# Patient Record
Sex: MALE | Race: WHITE | NOT HISPANIC OR LATINO | Employment: OTHER | URBAN - METROPOLITAN AREA
[De-identification: names, ages, dates, MRNs, and addresses within clinical notes are randomized per-mention and may not be internally consistent; named-entity substitution may affect disease eponyms.]

---

## 2022-06-02 ENCOUNTER — HOSPITAL ENCOUNTER (INPATIENT)
Facility: CLINIC | Age: 57
LOS: 2 days | Discharge: HOME | End: 2022-06-05
Attending: EMERGENCY MEDICINE | Admitting: INTERNAL MEDICINE
Payer: COMMERCIAL

## 2022-06-02 DIAGNOSIS — L03.116 CELLULITIS OF LEFT LOWER EXTREMITY: ICD-10-CM
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
## 2022-06-02 LAB
ANION GAP SERPL CALCULATED.3IONS-SCNC: 8 MMOL/L (ref 3–14)
BASOPHILS # BLD AUTO: 0 10E3/UL (ref 0–0.2)
BASOPHILS NFR BLD AUTO: 0 %
BUN SERPL-MCNC: 39 MG/DL (ref 7–30)
CALCIUM SERPL-MCNC: 9.5 MG/DL (ref 8.5–10.1)
CHLORIDE BLD-SCNC: 98 MMOL/L (ref 94–109)
CO2 SERPL-SCNC: 30 MMOL/L (ref 20–32)
CREAT SERPL-MCNC: 1.99 MG/DL (ref 0.66–1.25)
EOSINOPHIL # BLD AUTO: 0.1 10E3/UL (ref 0–0.7)
EOSINOPHIL NFR BLD AUTO: 1 %
ERYTHROCYTE [DISTWIDTH] IN BLOOD BY AUTOMATED COUNT: 13.1 % (ref 10–15)
GFR SERPL CREATININE-BSD FRML MDRD: 38 ML/MIN/1.73M2
GLUCOSE BLD-MCNC: 102 MG/DL (ref 70–99)
HCT VFR BLD AUTO: 40 % (ref 40–53)
HGB BLD-MCNC: 12.3 G/DL (ref 13.3–17.7)
HOLD SPECIMEN: NORMAL
IMM GRANULOCYTES # BLD: 0 10E3/UL
IMM GRANULOCYTES NFR BLD: 0 %
LYMPHOCYTES # BLD AUTO: 1.1 10E3/UL (ref 0.8–5.3)
LYMPHOCYTES NFR BLD AUTO: 12 %
MCH RBC QN AUTO: 28.2 PG (ref 26.5–33)
MCHC RBC AUTO-ENTMCNC: 30.8 G/DL (ref 31.5–36.5)
MCV RBC AUTO: 92 FL (ref 78–100)
MONOCYTES # BLD AUTO: 1 10E3/UL (ref 0–1.3)
MONOCYTES NFR BLD AUTO: 11 %
NEUTROPHILS # BLD AUTO: 6.9 10E3/UL (ref 1.6–8.3)
NEUTROPHILS NFR BLD AUTO: 76 %
NRBC # BLD AUTO: 0 10E3/UL
NRBC BLD AUTO-RTO: 0 /100
PLATELET # BLD AUTO: 228 10E3/UL (ref 150–450)
POTASSIUM BLD-SCNC: 4.4 MMOL/L (ref 3.4–5.3)
RBC # BLD AUTO: 4.36 10E6/UL (ref 4.4–5.9)
SODIUM SERPL-SCNC: 136 MMOL/L (ref 133–144)
WBC # BLD AUTO: 9.1 10E3/UL (ref 4–11)

## 2022-06-02 PROCEDURE — 86140 C-REACTIVE PROTEIN: CPT | Performed by: INTERNAL MEDICINE

## 2022-06-02 PROCEDURE — 87040 BLOOD CULTURE FOR BACTERIA: CPT | Performed by: EMERGENCY MEDICINE

## 2022-06-02 PROCEDURE — 93005 ELECTROCARDIOGRAM TRACING: CPT

## 2022-06-02 PROCEDURE — 80048 BASIC METABOLIC PNL TOTAL CA: CPT | Performed by: EMERGENCY MEDICINE

## 2022-06-02 PROCEDURE — C9803 HOPD COVID-19 SPEC COLLECT: HCPCS

## 2022-06-02 PROCEDURE — 83036 HEMOGLOBIN GLYCOSYLATED A1C: CPT | Performed by: EMERGENCY MEDICINE

## 2022-06-02 PROCEDURE — 36415 COLL VENOUS BLD VENIPUNCTURE: CPT | Performed by: EMERGENCY MEDICINE

## 2022-06-02 PROCEDURE — 99285 EMERGENCY DEPT VISIT HI MDM: CPT | Mod: 25

## 2022-06-02 PROCEDURE — 85025 COMPLETE CBC W/AUTO DIFF WBC: CPT | Performed by: EMERGENCY MEDICINE

## 2022-06-02 RX ORDER — AZITHROMYCIN 500 MG/5ML
500 INJECTION, POWDER, LYOPHILIZED, FOR SOLUTION INTRAVENOUS ONCE
Status: COMPLETED | OUTPATIENT
Start: 2022-06-02 | End: 2022-06-03

## 2022-06-02 RX ORDER — CEFUROXIME SODIUM 1.5 G/16ML
1.5 INJECTION, POWDER, FOR SOLUTION INTRAVENOUS ONCE
Status: DISCONTINUED | OUTPATIENT
Start: 2022-06-02 | End: 2022-06-03
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
## 2022-06-03 PROBLEM — L03.116 CELLULITIS OF LEFT LOWER EXTREMITY: Status: ACTIVE | Noted: 2022-06-03

## 2022-06-03 LAB
ATRIAL RATE - MUSE: 89 BPM
CRP SERPL-MCNC: 200 MG/L (ref 0–8)
DIASTOLIC BLOOD PRESSURE - MUSE: NORMAL MMHG
GLUCOSE BLDC GLUCOMTR-MCNC: 110 MG/DL (ref 70–99)
GLUCOSE BLDC GLUCOMTR-MCNC: 75 MG/DL (ref 70–99)
GLUCOSE BLDC GLUCOMTR-MCNC: 84 MG/DL (ref 70–99)
GLUCOSE BLDC GLUCOMTR-MCNC: 85 MG/DL (ref 70–99)
GLUCOSE BLDC GLUCOMTR-MCNC: 95 MG/DL (ref 70–99)
HBA1C MFR BLD: 6 % (ref 0–5.6)
INTERPRETATION ECG - MUSE: NORMAL
P AXIS - MUSE: 61 DEGREES
PR INTERVAL - MUSE: 166 MS
QRS DURATION - MUSE: 114 MS
QT - MUSE: 352 MS
QTC - MUSE: 428 MS
R AXIS - MUSE: -36 DEGREES
SARS-COV-2 RNA RESP QL NAA+PROBE: NEGATIVE
SYSTOLIC BLOOD PRESSURE - MUSE: NORMAL MMHG
T AXIS - MUSE: 86 DEGREES
VENTRICULAR RATE- MUSE: 89 BPM

## 2022-06-03 PROCEDURE — U0005 INFEC AGEN DETEC AMPLI PROBE: HCPCS | Performed by: EMERGENCY MEDICINE

## 2022-06-03 PROCEDURE — 87149 DNA/RNA DIRECT PROBE: CPT | Performed by: EMERGENCY MEDICINE

## 2022-06-03 PROCEDURE — 258N000003 HC RX IP 258 OP 636: Performed by: INTERNAL MEDICINE

## 2022-06-03 PROCEDURE — 96367 TX/PROPH/DG ADDL SEQ IV INF: CPT

## 2022-06-03 PROCEDURE — 87077 CULTURE AEROBIC IDENTIFY: CPT | Performed by: EMERGENCY MEDICINE

## 2022-06-03 PROCEDURE — 120N000004 HC R&B MS OVERFLOW

## 2022-06-03 PROCEDURE — 99223 1ST HOSP IP/OBS HIGH 75: CPT | Mod: AI | Performed by: INTERNAL MEDICINE

## 2022-06-03 PROCEDURE — 250N000011 HC RX IP 250 OP 636: Performed by: EMERGENCY MEDICINE

## 2022-06-03 PROCEDURE — 250N000011 HC RX IP 250 OP 636: Performed by: INTERNAL MEDICINE

## 2022-06-03 PROCEDURE — 96375 TX/PRO/DX INJ NEW DRUG ADDON: CPT

## 2022-06-03 PROCEDURE — 96365 THER/PROPH/DIAG IV INF INIT: CPT

## 2022-06-03 PROCEDURE — 250N000013 HC RX MED GY IP 250 OP 250 PS 637: Performed by: INTERNAL MEDICINE

## 2022-06-03 PROCEDURE — 96366 THER/PROPH/DIAG IV INF ADDON: CPT

## 2022-06-03 PROCEDURE — 96361 HYDRATE IV INFUSION ADD-ON: CPT

## 2022-06-03 PROCEDURE — 258N000003 HC RX IP 258 OP 636: Performed by: EMERGENCY MEDICINE

## 2022-06-03 PROCEDURE — 36415 COLL VENOUS BLD VENIPUNCTURE: CPT | Performed by: EMERGENCY MEDICINE

## 2022-06-03 RX ORDER — NICOTINE POLACRILEX 4 MG
15-30 LOZENGE BUCCAL
Status: DISCONTINUED | OUTPATIENT
Start: 2022-06-03 | End: 2022-06-05 | Stop reason: HOSPADM

## 2022-06-03 RX ORDER — DEXTROSE MONOHYDRATE 25 G/50ML
25-50 INJECTION, SOLUTION INTRAVENOUS
Status: DISCONTINUED | OUTPATIENT
Start: 2022-06-03 | End: 2022-06-05 | Stop reason: HOSPADM

## 2022-06-03 RX ORDER — ACETAMINOPHEN 325 MG/1
650 TABLET ORAL EVERY 6 HOURS PRN
Status: DISCONTINUED | OUTPATIENT
Start: 2022-06-03 | End: 2022-06-05 | Stop reason: HOSPADM

## 2022-06-03 RX ORDER — LOSARTAN POTASSIUM 100 MG/1
100 TABLET ORAL DAILY
Status: DISCONTINUED | OUTPATIENT
Start: 2022-06-03 | End: 2022-06-05 | Stop reason: HOSPADM

## 2022-06-03 RX ORDER — LOSARTAN POTASSIUM 100 MG/1
100 TABLET ORAL DAILY
COMMUNITY

## 2022-06-03 RX ORDER — SODIUM CHLORIDE 9 MG/ML
INJECTION, SOLUTION INTRAVENOUS CONTINUOUS
Status: DISCONTINUED | OUTPATIENT
Start: 2022-06-03 | End: 2022-06-03

## 2022-06-03 RX ORDER — LIDOCAINE 40 MG/G
CREAM TOPICAL
Status: DISCONTINUED | OUTPATIENT
Start: 2022-06-03 | End: 2022-06-05 | Stop reason: HOSPADM

## 2022-06-03 RX ORDER — HEPARIN SODIUM 5000 [USP'U]/.5ML
5000 INJECTION, SOLUTION INTRAVENOUS; SUBCUTANEOUS EVERY 12 HOURS
Status: DISCONTINUED | OUTPATIENT
Start: 2022-06-03 | End: 2022-06-05 | Stop reason: HOSPADM

## 2022-06-03 RX ORDER — CEFTRIAXONE 1 G/1
1 INJECTION, POWDER, FOR SOLUTION INTRAMUSCULAR; INTRAVENOUS EVERY 24 HOURS
Status: DISCONTINUED | OUTPATIENT
Start: 2022-06-03 | End: 2022-06-04

## 2022-06-03 RX ADMIN — LOSARTAN POTASSIUM 100 MG: 100 TABLET, FILM COATED ORAL at 17:08

## 2022-06-03 RX ADMIN — HEPARIN SODIUM 5000 UNITS: 10000 INJECTION, SOLUTION INTRAVENOUS; SUBCUTANEOUS at 20:04

## 2022-06-03 RX ADMIN — CEFTRIAXONE 1 G: 1 INJECTION, POWDER, FOR SOLUTION INTRAMUSCULAR; INTRAVENOUS at 09:06

## 2022-06-03 RX ADMIN — SODIUM CHLORIDE: 9 INJECTION, SOLUTION INTRAVENOUS at 13:43

## 2022-06-03 RX ADMIN — SODIUM CHLORIDE: 9 INJECTION, SOLUTION INTRAVENOUS at 03:10

## 2022-06-03 RX ADMIN — AZITHROMYCIN MONOHYDRATE 500 MG: 500 INJECTION, POWDER, LYOPHILIZED, FOR SOLUTION INTRAVENOUS at 01:46

## 2022-06-03 RX ADMIN — HEPARIN SODIUM 5000 UNITS: 10000 INJECTION, SOLUTION INTRAVENOUS; SUBCUTANEOUS at 09:07

## 2022-06-03 RX ADMIN — CEFUROXIME 1.5 G: 1.5 INJECTION, POWDER, FOR SOLUTION INTRAVENOUS at 00:57

## 2022-06-03 ASSESSMENT — ACTIVITIES OF DAILY LIVING (ADL)
ADLS_ACUITY_SCORE: 35
ADLS_ACUITY_SCORE: 35
ADLS_ACUITY_SCORE: 20
ADLS_ACUITY_SCORE: 35
ADLS_ACUITY_SCORE: 20
ADLS_ACUITY_SCORE: 35
ADLS_ACUITY_SCORE: 20
ADLS_ACUITY_SCORE: 35

## 2022-06-03 ASSESSMENT — ENCOUNTER SYMPTOMS
DIZZINESS: 0
FEVER: 0
NAUSEA: 1
APPETITE CHANGE: 1

## 2022-06-03 NOTE — PHARMACY-ADMISSION MEDICATION HISTORY
Admission medication history interview status for this patient is complete. See [facility] admission navigator for allergy information, prior to admission medications and immunization status.     Medication history interview done, indicate source(s): Patient  Medication history resources (including written lists, pill bottles, clinic record):None  Pharmacy: [facility] [place]    Changes made to PTA medication list:  Added: metformin, losartan, vitamin B complex, zinc  Changed: None  Reported as Not Taking: None  Removed: allopurinol, aspirin, Santyl ointment, levothyroxine, lisinopril, simvastatin    Actions taken by pharmacist (provider contacted, etc):None     Additional medication history information: Pt states he normally uses 23 units of Semglee at bedtime, but only used 11 units Wednesday night because he was not eating as much.    Medication reconciliation/reorder completed by provider prior to medication history?  N   (Y/N)     For patients on insulin therapy:   Do you use sliding scale insulin based on blood sugars? No  Do you typically eat three meals a day? No, normally 2 per day - appetite has decreased since Monday  How many times do you check your blood glucose per day? Once per day in am  How many episodes of hypoglycemia do you typically have per month? Very rare, last episode ~4-5 weeks ago, symptoms lingered longer than normal. BG gets down to about 95.  Do you have a Continuous Glucose Monitor (CGM)?  No    Prior to Admission medications    Medication Sig Last Dose Taking? Auth Provider   insulin glargine (LANTUS VIAL) 100 UNIT/ML vial Inject 30 Units Subcutaneous At Bedtime  Patient taking differently: Inject 23 Units Subcutaneous At Bedtime 6/1/2022 at 11 units d/t not eating Yes Matthew Rodriguez MD   losartan (COZAAR) 100 MG tablet Take 100 mg by mouth daily 6/2/2022 at am Yes Unknown, Entered By History   metFORMIN (GLUCOPHAGE) 1000 MG tablet Take 1,000 mg by mouth 2 times daily (with  meals) 6/2/2022 at am Yes Unknown, Entered By History   Multiple Vitamins-Minerals (MULTIVITAMIN PO) Take 1 tablet by mouth daily  6/2/2022 at am Yes Reported, Patient   Multiple Vitamins-Minerals (ZINC PO) Take 1 tablet by mouth daily 6/2/2022 at am Yes Unknown, Entered By History   triamcinolone (KENALOG) 0.1 % cream Apply topically 2 times daily as needed  prn at prn Yes Reported, Patient   vitamin B-Complex Take 1 tablet by mouth daily 6/2/2022 at am Yes Unknown, Entered By History

## 2022-06-03 NOTE — ED PROVIDER NOTES
"  History   Chief Complaint:  Cellulitis and Wound Check       The history is provided by the patient.      Donnie Morales is a 57 year old male with history of cellulitis who presents with cellulitis and wound check. He first noticed swelling to his left lower leg 3 days ago. He initially thought he had gout as he has a history of this. Then the next day, he developed redness around the same area. Additionally, he mentions \"pink blotches\" on his left thigh that are painful to the touch. Other symptoms mentioned include nausea and decreased appetite. He mentions that he had a cat and is susceptible to scratches. He was initially seen at [facility] for this, but was sent to the ED for further assessment. 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 He denies fever, chest pain, or dizziness.     Review of Systems   Constitutional: Positive for appetite change. Negative for fever.   Cardiovascular: Positive for leg swelling. Negative for chest pain.   Gastrointestinal: Positive for nausea.   Skin: Positive for rash.   Neurological: Negative for dizziness.   All other systems reviewed and are negative.    Allergies:  No Known Allergies    Medications:  Allopurinol   Asprin   Synthroid   Lisinopril   Simvastatin     Past Medical History:     Orthostatic hypotension   Anemia, unspecified   Vitamin D deficiency   Leukocytoclastic vasculitis   Hyponatremia   Malnutrition of moderate degree   Amput Below Knee left    Hyponatremia   Coagulopathy   Hematuria   Gram positive sepsis   Clostridium difficile colitis   Purpura   Acute renal failure   Hyperkalemia   Gout, unspecified   Diabetes mellitus type II, uncontrolled   Acute blood loss anemia   Urinary retention   Gangrenous cellulitis   Hyponatremia  GOUT  Coagulation factor disorder   Cellulitis     Past Surgical History:    R below the knee amputation   Bilateral cataract extraction     Family History:    Mother - cancer     Social " History:  Patient presents to the ED alone via private vehicle     Physical Exam     Patient Vitals for the past 24 hrs:   BP Temp Temp src Pulse Resp SpO2 Weight   06/03/22 0154 (!) 145/72 98.1  F (36.7  C) Oral 78 16 -- --   06/03/22 0115 (!) 152/77 -- -- 80 -- 96 % --   06/03/22 0045 (!) 155/77 -- -- 81 16 97 % --   06/03/22 0030 (!) 154/83 -- -- 84 -- 98 % --   06/03/22 0015 (!) 151/80 -- -- 81 -- 97 % --   06/03/22 0000 (!) 159/82 -- -- 79 -- 98 % --   06/02/22 2345 (!) 191/100 -- -- 91 -- 99 % --   06/02/22 2023 -- -- -- 78 -- -- --   06/02/22 2021 (!) 200/98 97.8  F (36.6  C) Temporal -- 16 94 % 99.8 kg (220 lb)       Physical Exam  Constitutional:       General: He is not in acute distress.     Appearance: He is not diaphoretic.   HENT:      Head: Atraumatic.   Eyes:      General: No scleral icterus.     Pupils: Pupils are equal, round, and reactive to light.   Cardiovascular:      Rate and Rhythm: Normal rate and regular rhythm.      Heart sounds: Normal heart sounds.   Pulmonary:      Effort: No respiratory distress.      Breath sounds: Normal breath sounds.   Abdominal:      General: Bowel sounds are normal.      Palpations: Abdomen is soft.      Tenderness: There is no abdominal tenderness.   Musculoskeletal:      Cervical back: Neck supple.      Comments: Right below the knee amputation   Skin:     General: Skin is warm.      Capillary Refill: Capillary refill takes less than 2 seconds.      Comments: Perfusion to the foot is normal.  There is diffuse erythema of the left calf area.  There is lymphangitis streaking onto the left thigh up to the level of the groin.  There is no crepitance or sloughing.  There is minimal tenderness in this area.  Patient does have a wound over the posterior calf that he says was from a cat scratch.   Neurological:      Comments: Diminished sensation in the left foot related to neuropathy   Psychiatric:         Mood and Affect: Mood normal.         Behavior: Behavior  normal.       Emergency Department Course   ECG:  ECG taken at 2037, ECG read at 2041  Normal sinus rhythm   Left axis deviation   Left ventricular hypertrophy with repolarization abnormality   Abnormal ECG   Rate 89 bpm. AL interval 166 ms. QRS duration 114 ms. QT/QTc 352/428 ms. P-R-T axes 61 -36 86.     Laboratory:  Labs Ordered and Resulted from Time of ED Arrival to Time of ED Departure   BASIC METABOLIC PANEL - Abnormal       Result Value    Sodium 136      Potassium 4.4      Chloride 98      Carbon Dioxide (CO2) 30      Anion Gap 8      Urea Nitrogen 39 (*)     Creatinine 1.99 (*)     Calcium 9.5      Glucose 102 (*)     GFR Estimate 38 (*)    CBC WITH PLATELETS AND DIFFERENTIAL - Abnormal    WBC Count 9.1      RBC Count 4.36 (*)     Hemoglobin 12.3 (*)     Hematocrit 40.0      MCV 92      MCH 28.2      MCHC 30.8 (*)     RDW 13.1      Platelet Count 228      % Neutrophils 76      % Lymphocytes 12      % Monocytes 11      % Eosinophils 1      % Basophils 0      % Immature Granulocytes 0      NRBCs per 100 WBC 0      Absolute Neutrophils 6.9      Absolute Lymphocytes 1.1      Absolute Monocytes 1.0      Absolute Eosinophils 0.1      Absolute Basophils 0.0      Absolute Immature Granulocytes 0.0      Absolute NRBCs 0.0     COVID-19 VIRUS (CORONAVIRUS) BY PCR - Normal    SARS CoV2 PCR Negative     GLUCOSE BY METER - Normal    GLUCOSE BY METER POCT 95     GLUCOSE MONITOR NURSING POCT   GLUCOSE MONITOR NURSING POCT   GLUCOSE MONITOR NURSING POCT   GLUCOSE MONITOR NURSING POCT   GLUCOSE MONITOR NURSING POCT   CRP INFLAMMATION   BLOOD CULTURE   BLOOD CULTURE     Emergency Department Course:    Reviewed:  I reviewed nursing notes, vitals and past medical history    Assessments:  2345 I obtained history and examined the patient as noted above. I discussed plan for admission to the hospital.    Consults:  0040 I spoke with Dr. Cullen from [facility], who accepts the patient for admission.    Disposition:  The  patient was admitted to the hospital under the care of Dr. Cullen.     Impression & Plan   Medical Decision Making:  This patient is a 57-year-old man with a history of diabetes who presents to the emergency department for evaluation of swelling and erythema with pain of the left calf area.  He appears to have cellulitis and today has developed lymphangitis up onto the thigh to the level of the groin.  He does not appear to be septic.  Blood cultures obtained.  Given that this was a cat scratch he was started on cefuroxime and azithromycin.  He will be admitted to the hospital service.    Diagnosis:    ICD-10-CM    1. Cellulitis of left lower extremity  L03.116    2. Cat scratch  W55.03XA        Scribe Disclosure:  I, Dwight Choudhury, am serving as a scribe at 11:45 PM on 6/2/2022 to document services personally performed by Alfred Doyle MD based on my observations and the provider's statements to me.        Alfred Doyle MD  06/03/22 0208

## 2022-06-03 NOTE — ED NOTES
"[facility]  ED Nurse Handoff Report    Donnie Morales is a 57 year old male   ED Chief complaint: Cellulitis and Wound Check  . ED Diagnosis:   Final diagnoses:   Cellulitis of left lower extremity   Cat scratch     Allergies: No Known Allergies    Code Status: Full Code  Activity level - Baseline/Home:  Independent. Activity Level - Current:   Independent. Lift room needed: No. Bariatric: No   Needed: No   Isolation: No. Infection: Not Applicable.     Vital Signs:   Vitals:    06/03/22 0000 06/03/22 0015 06/03/22 0030 06/03/22 0045   BP: (!) 159/82 (!) 151/80 (!) 154/83 (!) 155/77   Pulse: 79 81 84 81   Resp:    16   Temp:       TempSrc:       SpO2: 98% 97% 98% 97%   Weight:           Cardiac Rhythm:  ,      Pain level:    Patient confused: No. Patient Falls Risk: Yes.   Elimination Status: Has voided   Patient Report - Initial Complaint: Cellulitis, wound check. Focused Assessment:    Peripheral Neurovascular (Adult) - Peripheral Neurovascular WDL: .WDL except; neurovascular assessment lower  Additional Documentation: Edema (Group)   Edema - Edema: dependent; leg, left; ankle, left  Dependent Edema: 1+ (Trace)  Leg, Left Edema: 1+ (Trace)  Ankle, Left Edema: 1+ (Trace)   LLE Neurovascular Assessment - Temperature LLE: warm  Color LLE: red; storm  Sensation LLE: numbness present; tingling present (Pt states, \"Numbness and tingly is baseline.\".)  RLE Neurovascular Assessment - Temperature RLE:  (Right BKA.)  Pain/Comfort - Patient Currently in Pain: no  Preferred Pain Scale (Adult): DVPRS (Group)   DVPRS Pain Rating Score - (DVPRS) Pain Rating Score : No pain  Interfered with your usual ACTIVITY?: Does not interfere 0  Interfered with your SLEEP?: Does not interfere 0  Affected your MOOD?: Does not affect 0  Contributed to your STRESS?: Does not contribute 0   Tests Performed:   No orders to display     . Abnormal Results:   Labs Ordered and Resulted from Time of ED Arrival to Time of ED " Departure   BASIC METABOLIC PANEL - Abnormal       Result Value    Sodium 136      Potassium 4.4      Chloride 98      Carbon Dioxide (CO2) 30      Anion Gap 8      Urea Nitrogen 39 (*)     Creatinine 1.99 (*)     Calcium 9.5      Glucose 102 (*)     GFR Estimate 38 (*)    CBC WITH PLATELETS AND DIFFERENTIAL - Abnormal    WBC Count 9.1      RBC Count 4.36 (*)     Hemoglobin 12.3 (*)     Hematocrit 40.0      MCV 92      MCH 28.2      MCHC 30.8 (*)     RDW 13.1      Platelet Count 228      % Neutrophils 76      % Lymphocytes 12      % Monocytes 11      % Eosinophils 1      % Basophils 0      % Immature Granulocytes 0      NRBCs per 100 WBC 0      Absolute Neutrophils 6.9      Absolute Lymphocytes 1.1      Absolute Monocytes 1.0      Absolute Eosinophils 0.1      Absolute Basophils 0.0      Absolute Immature Granulocytes 0.0      Absolute NRBCs 0.0     COVID-19 VIRUS (CORONAVIRUS) BY PCR   BLOOD CULTURE   BLOOD CULTURE     .   Treatments provided: IV abx.  Family Comments: None at bedside.  OBS brochure/video discussed/provided to patient:  No  ED Medications:   Medications   cefuroxime (ZINACEF) 1.5 g vial to attach to  ml bag for ADULTS or NS 50 ml bag for PEDS (1.5 g Intravenous New Bag 6/3/22 0057)   azithromycin 500 mg (ZITHROMAX) in 0.9% NaCl 250 mL intermittent infusion 500 mg (has no administration in time range)     Drips infusing:  Yes  For the majority of the shift, the patient's behavior Green. Interventions performed were NA.    Sepsis treatment initiated: No     Patient tested for COVID 19 prior to admission: YES    ED Nurse Name/Phone Number: Coy Ferrer RN,   1:15 AM    RECEIVING UNIT ED HANDOFF REVIEW    Above ED Nurse Handoff Report was reviewed: Yes  Reviewed by: Rosemary Velasco RN on Luana 3, 2022 at 12:24 PM

## 2022-06-03 NOTE — PLAN OF CARE
Pt calm/cooperative, A/Ox4, denies pain, SOB or discomfort.Pt ambulates independently room with prosthesis. Abx. Given, cultures sent, able to verbalize needs. Will continue to monitor.  Vitals: BP (!) 145/72   Pulse 78   Temp 98.1  F (36.7  C) (Oral)   Resp 16   Wt 99.8 kg (220 lb)   SpO2 96%   BMI 31.57 kg/m    BMI= Body mass index is 31.57 kg/m .

## 2022-06-03 NOTE — ED NOTES
Admit note completed.  Report given to nurse taking over cares for the pt. Questions asked and answered.

## 2022-06-03 NOTE — ED TRIAGE NOTES
R BKA. Pt states since Monday LLE redness, blotching color, Pulses present, CMS in tact. Pt states chronic scratches from cat. Denies hx. Seen at [facility] and referred for workup. ABC in tact. A/OX4,

## 2022-06-03 NOTE — H&P
Admitted: 2022    CHIEF COMPLAINT:  Redness of the left lower extremity.    HISTORY OF PRESENT ILLNESS:  Obtained from the patient.  This is a 57-year-old gentleman with a history of type 2 diabetes, hypertension, CKD stage III, who lives alone with his cat.  On Monday, he started noticing some redness around his ankles.  Initially thought it was gout as he had some pain in his ankle.  This subsided after he took a Naprosyn and then he noticed the redness progressing more proximally up through his knee and into his groin region.  He denies any pain.  He denies any fevers or chills.  He denies any antecedent trauma; however, he states that he has a cat, which may have scratched him.  Blood glucoses have been running in the one-teens.  He denies any chest pain, shortness of breath, nausea or vomiting, or lightheadedness.  He has had some decreased appetite since Monday.  He went initially to an urgent care; however, he was noted to be hypertensive, and given his diabetes, he was referred here to the ER for further evaluation.  In the ER, he was seen by Dr. Alfred Doyle I discussed care with him and I am asked to admit him for further evaluation.    MEDICATIONS:  List includes:  1.  Metformin.  2.  Lantus.  3.  Losartan.    PAST MEDICAL HISTORY:  Diabetes.  Gout.  Hypertension.  CKD stage III.    PAST SURGICAL HISTORY:  Significant for right below-the-knee amputation and bilateral cataract surgery.    FAMILY HISTORY:  Significant for mother who  of some form of cancer with metastasis.    SOCIAL HISTORY:  The patient does not smoke.  He does not drink alcohol.    ALLERGIES:  No known drug allergies.    REVIEW OF SYSTEMS:  As mentioned in the HPI.  The patient denies any recent travel.  All other systems otherwise extensively reviewed and deemed unremarkable and negative.    PHYSICAL EXAMINATION:    VITAL SIGNS:  His temperature is 97.8, his pulse is 78, blood pressure is 200/98, respiratory rate 16, O2 sat  is 94% on room air.  GENERAL:  He is alert, awake, oriented, coherent, nontoxic, in no acute distress on exam.  HEENT:  His pupils are equal, round, reactive to light.  LUNGS:  Clear to auscultation bilaterally.  HEART:  Regular rate.  S1, S2 normal.  No murmurs or gallops.  ABDOMEN:  Soft, nontender, nondistended with good bowel sounds.    EXTREMITIES:  He has a prosthesis on the right lower extremity.  There is no redness noted above-the-knee on the right lower extremity on the left lower extremity, he has had amputation of his second digit.  The site is clean, dry and intact.  He has erythema, warmth, which extends from his ankle up to the knee and then there is lymphangitic spread up to his groin.  There is no palpable lymphadenopathy in the groin.  He has an underlying neuropathy and is not very tender to palpation.  His pulses are feeble, but palpable.  NEUROLOGICAL:  He moves all his extremities.  Cranial nerves II through XII are grossly within normal limits.    LABORATORY DATA:  Obtained here included a basic metabolic panel which is grossly unremarkable other than a BUN of 39 and a creatinine of 1.99 with an estimated GFR of 38.  His glucose was 102.  CBC with diff is grossly unremarkable other than a hemoglobin of 12.3.  Blood cultures obtained in the ER are pending.  An EKG shows normal sinus rhythm at 89 beats per minute with left axis deviation and LVH by voltage criteria.    IMPRESSION/PLAN:    1.  Cellulitis of the left lower extremity immunocompromised gentleman with underlying diabetes and CKD stage III.  We will admit him as an inpatient.  I suspect this is likely due to strep infection.  We will place him on IV Rocephin.  We will have him elevate his left lower extremity.  Blood cultures have been obtained.      2.  CKD stage III.  Avoid nephrotoxins.    3.  Hypertensive urgency.  At baseline, he takes losartan will need resumption once his home medications are reconciled.    4.  Diabetes.  We  will place him on insulin along with sliding scale.    CODE STATUS:  FULL CODE.    He will be admitted as an inpatient.    Jono Cullen MD        D: 2022   T: 2022   MT: [id number]    Name:     JANY HARRINGTON  MRN:      [id number]        Account:     [id number]   :      1965           Admitted:    2022       Document: [id number]

## 2022-06-03 NOTE — PROGRESS NOTES
Agree with assessment and plan provided by Dr. Cullen.  Continue ceftiaxone for now.  Suspect cellulitis secondary to cat scratch, though pt has significant neuropathy so he is unsure if he was scratched.  Pt with decreased appetite and normal BS.  Will hold lantus for now and continue ISS.  If no improvement in cellulitis tomorrow or worsened, will adjust abx to Unasyn.

## 2022-06-03 NOTE — PLAN OF CARE
Pt arrived at approx 1300 from ER. Bp elevated slightly. Denies pain. Left lower extremity red, slightly swollen. Lle elevated. No obvious wound noted. Pt right left stump, 3 red areas,( 1 on stump bottom, 1 on lower thigh and 1 on left sife of knee).  Ivf infusing. Abx given in er this am. OT 75, pt tolerating mod cho diet.     Plan of Care Reviewed With: patient

## 2022-06-04 ENCOUNTER — HEALTH MAINTENANCE LETTER (OUTPATIENT)
Age: 57
End: 2022-06-04

## 2022-06-04 LAB
ANION GAP SERPL CALCULATED.3IONS-SCNC: 8 MMOL/L (ref 3–14)
BASOPHILS # BLD AUTO: 0 10E3/UL (ref 0–0.2)
BASOPHILS NFR BLD AUTO: 0 %
BUN SERPL-MCNC: 32 MG/DL (ref 7–30)
CALCIUM SERPL-MCNC: 9.3 MG/DL (ref 8.5–10.1)
CHLORIDE BLD-SCNC: 107 MMOL/L (ref 94–109)
CO2 SERPL-SCNC: 27 MMOL/L (ref 20–32)
CREAT SERPL-MCNC: 1.53 MG/DL (ref 0.66–1.25)
ENTEROCOCCUS FAECALIS: NOT DETECTED
ENTEROCOCCUS FAECIUM: NOT DETECTED
EOSINOPHIL # BLD AUTO: 0.1 10E3/UL (ref 0–0.7)
EOSINOPHIL NFR BLD AUTO: 1 %
ERYTHROCYTE [DISTWIDTH] IN BLOOD BY AUTOMATED COUNT: 12.9 % (ref 10–15)
GFR SERPL CREATININE-BSD FRML MDRD: 53 ML/MIN/1.73M2
GLUCOSE BLD-MCNC: 124 MG/DL (ref 70–99)
GLUCOSE BLDC GLUCOMTR-MCNC: 112 MG/DL (ref 70–99)
GLUCOSE BLDC GLUCOMTR-MCNC: 132 MG/DL (ref 70–99)
GLUCOSE BLDC GLUCOMTR-MCNC: 178 MG/DL (ref 70–99)
GLUCOSE BLDC GLUCOMTR-MCNC: 198 MG/DL (ref 70–99)
GLUCOSE BLDC GLUCOMTR-MCNC: 95 MG/DL (ref 70–99)
HCT VFR BLD AUTO: 36.3 % (ref 40–53)
HGB BLD-MCNC: 11.2 G/DL (ref 13.3–17.7)
IMM GRANULOCYTES # BLD: 0 10E3/UL
IMM GRANULOCYTES NFR BLD: 0 %
LISTERIA SPECIES (DETECTED/NOT DETECTED): NOT DETECTED
LYMPHOCYTES # BLD AUTO: 1.1 10E3/UL (ref 0.8–5.3)
LYMPHOCYTES NFR BLD AUTO: 15 %
MCH RBC QN AUTO: 28.4 PG (ref 26.5–33)
MCHC RBC AUTO-ENTMCNC: 30.9 G/DL (ref 31.5–36.5)
MCV RBC AUTO: 92 FL (ref 78–100)
MONOCYTES # BLD AUTO: 0.7 10E3/UL (ref 0–1.3)
MONOCYTES NFR BLD AUTO: 10 %
NEUTROPHILS # BLD AUTO: 5.2 10E3/UL (ref 1.6–8.3)
NEUTROPHILS NFR BLD AUTO: 74 %
NRBC # BLD AUTO: 0 10E3/UL
NRBC BLD AUTO-RTO: 0 /100
PLATELET # BLD AUTO: 226 10E3/UL (ref 150–450)
POTASSIUM BLD-SCNC: 3.9 MMOL/L (ref 3.4–5.3)
RBC # BLD AUTO: 3.94 10E6/UL (ref 4.4–5.9)
SODIUM SERPL-SCNC: 142 MMOL/L (ref 133–144)
STAPHYLOCOCCUS AUREUS: NOT DETECTED
STAPHYLOCOCCUS EPIDERMIDIS: NOT DETECTED
STAPHYLOCOCCUS LUGDUNENSIS: NOT DETECTED
STAPHYLOCOCCUS SPECIES: DETECTED
STREPTOCOCCUS AGALACTIAE: NOT DETECTED
STREPTOCOCCUS ANGINOSUS GROUP: NOT DETECTED
STREPTOCOCCUS PNEUMONIAE: NOT DETECTED
STREPTOCOCCUS PYOGENES: NOT DETECTED
STREPTOCOCCUS SPECIES: NOT DETECTED
WBC # BLD AUTO: 7.1 10E3/UL (ref 4–11)

## 2022-06-04 PROCEDURE — 99232 SBSQ HOSP IP/OBS MODERATE 35: CPT | Performed by: INTERNAL MEDICINE

## 2022-06-04 PROCEDURE — 87040 BLOOD CULTURE FOR BACTERIA: CPT | Performed by: HOSPITALIST

## 2022-06-04 PROCEDURE — 250N000013 HC RX MED GY IP 250 OP 250 PS 637: Performed by: INTERNAL MEDICINE

## 2022-06-04 PROCEDURE — 120N000004 HC R&B MS OVERFLOW

## 2022-06-04 PROCEDURE — 80048 BASIC METABOLIC PNL TOTAL CA: CPT | Performed by: INTERNAL MEDICINE

## 2022-06-04 PROCEDURE — 250N000011 HC RX IP 250 OP 636: Performed by: INTERNAL MEDICINE

## 2022-06-04 PROCEDURE — 85025 COMPLETE CBC W/AUTO DIFF WBC: CPT | Performed by: INTERNAL MEDICINE

## 2022-06-04 PROCEDURE — 250N000011 HC RX IP 250 OP 636: Performed by: HOSPITALIST

## 2022-06-04 PROCEDURE — 36415 COLL VENOUS BLD VENIPUNCTURE: CPT | Performed by: INTERNAL MEDICINE

## 2022-06-04 PROCEDURE — 250N000012 HC RX MED GY IP 250 OP 636 PS 637: Performed by: INTERNAL MEDICINE

## 2022-06-04 RX ORDER — CEFTRIAXONE 2 G/1
2 INJECTION, POWDER, FOR SOLUTION INTRAMUSCULAR; INTRAVENOUS EVERY 24 HOURS
Status: DISCONTINUED | OUTPATIENT
Start: 2022-06-04 | End: 2022-06-05 | Stop reason: HOSPADM

## 2022-06-04 RX ADMIN — INSULIN ASPART 1 UNITS: 100 INJECTION, SOLUTION INTRAVENOUS; SUBCUTANEOUS at 18:14

## 2022-06-04 RX ADMIN — HEPARIN SODIUM 5000 UNITS: 10000 INJECTION, SOLUTION INTRAVENOUS; SUBCUTANEOUS at 19:57

## 2022-06-04 RX ADMIN — HEPARIN SODIUM 5000 UNITS: 10000 INJECTION, SOLUTION INTRAVENOUS; SUBCUTANEOUS at 08:14

## 2022-06-04 RX ADMIN — LOSARTAN POTASSIUM 100 MG: 100 TABLET, FILM COATED ORAL at 08:14

## 2022-06-04 RX ADMIN — CEFTRIAXONE 2 G: 2 INJECTION, POWDER, FOR SOLUTION INTRAMUSCULAR; INTRAVENOUS at 08:14

## 2022-06-04 ASSESSMENT — ACTIVITIES OF DAILY LIVING (ADL)
ADLS_ACUITY_SCORE: 20

## 2022-06-04 NOTE — PLAN OF CARE
Evening RN    VSS but has elevated BPs. Denies pain. Up independently in the room. Redness and swelling on Left lower extremity - elevated on wedge. Marked areas of redness. Has below knee amputation on right lower extremity with 3 points of redness noted on stump. Voiding adequately.  Tolerating mod carb diet well.  Blood sugars 84, 110. Will continue to monitor.    BP (!) 161/75   Pulse 82   Temp 97.9  F (36.6  C) (Oral)   Resp 16   Wt 99.8 kg (220 lb)   SpO2 95%   BMI 31.57 kg/m

## 2022-06-04 NOTE — PROGRESS NOTES
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  In the emergency department the patient was found to have a temperature of 97.8  F, heart rate 78, blood pressure 200/98, respiratory rate 16, SPO2 94% on room air.  Initial lab work revealed BUN 39, creatinine 1.99, , A1c 6.0, WBC 9.1, hemoglobin 12.3.  The patient was started on IV ceftriaxone with concern for left lower extremity cellulitis.  Blood cultures were obtained.  Of note, 1 out of 2 blood cultures returned positive for coagulase-negative Staphylococcus, which was thought likely a contaminant.    TODAY'S PLAN:  1/2 blood cultures positive for coag-negative staph.  Suspect contaminant.  Repeat blood culture pending.  Continue ceftriaxone.  Anticipate discharge home tomorrow morning with PO Augmentin.  Discussed follow up with PCP and conversation about having abx called in to pharmacy in case pt develops redness again he would have quicker access to abx in an attempt to keep pt out of the hospital.      Problem List:   LLE Cellulitis  - Possibly secondary to cat scratch  - Continue IV Ceftriaxone    1/2 Blood Culture Positive for Coag-Neg Staph  - Suspect contaminant  - Repeat blood culture pending  - Continue Ceftriaxone    Hypertensive Urgency  - Improved  - Continue Losartan    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  - A1C = 6.0 on 6/2/22  - Lantus 11 units on hold secondary to normoglycemia and decreased oral intake  - Continue ISS  - PTA Metformin on hold    Chronic Kidney Disease Stage 3  - Baseline Cr = 1.9-2.1  - Avoid Nephrotoxins  - Daily BMP    DVT Prophylaxis: Pneumatic  Compression Devices  Code Status: Full Code  Diet: Moderate Consistent Carb (60 g CHO per Meal) Diet    Amado Catheter: Not present  Disposition: Expected discharge tomorrow to home. Goals prior to discharge include abx plan established.   Family updated today: No     Interval History   Pt seen and examined.  Pt states he feels like the swelling is improving.     -Data reviewed today: I personally reviewed all new labs and imaging results over the last 24 hours.     Physical Exam   Temp: 97.6  F (36.4  C) Temp src: Oral BP: (!) 147/75 Pulse: 68   Resp: 16 SpO2: 96 % O2 Device: None (Room air)    Vitals:    06/02/22 2021   Weight: 99.8 kg (220 lb)     Vital Signs with Ranges  Temp:  [97.6  F (36.4  C)-98.2  F (36.8  C)] 97.6  F (36.4  C)  Pulse:  [68-82] 68  Resp:  [15-16] 16  BP: (147-171)/(75-87) 147/75  Cuff Mean (mmHg):  [109] 109  SpO2:  [94 %-98 %] 96 %  No intake/output data recorded.    GENERAL: No apparent distress. Awake, alert, and fully oriented.  HEENT: Normocephalic, atraumatic. Extraocular movements intact.  CARDIOVASCULAR: Regular rate and rhythm without murmurs or rubs. No S3.  PULMONARY: Clear bilaterally.  GASTROINTESTINAL: Soft, non-tender, non-distended. Bowel sounds normoactive.   EXTREMITIES: Erythema to left leg  NEUROLOGICAL: CN 2-12 grossly intact, no focal neurological deficits.  DERMATOLOGICAL: No rash, ulcer, bruising, nor jaundice.    Medications       cefTRIAXone  2 g Intravenous Q24H     heparin ANTICOAGULANT  5,000 Units Subcutaneous Q12H     insulin aspart  1-7 Units Subcutaneous TID AC     insulin aspart  1-5 Units Subcutaneous At Bedtime     [Held by provider] insulin glargine  10 Units Subcutaneous BID     losartan  100 mg Oral Daily     sodium chloride (PF)  3 mL Intracatheter Q8H     Data     Laboratory:  Recent Labs   Lab 06/04/22 0630 06/02/22 2126   WBC 7.1 9.1   HGB 11.2* 12.3*   HCT 36.3* 40.0   MCV 92 92    228     Recent Labs   Lab 06/04/22  0741 06/04/22  0630  06/04/22  0240 06/03/22  0200 06/02/22  2126   NA  --  142  --   --  136   POTASSIUM  --  3.9  --   --  4.4   CHLORIDE  --  107  --   --  98   CO2  --  27  --   --  30   ANIONGAP  --  8  --   --  8   * 124* 95   < > 102*   BUN  --  32*  --   --  39*   CR  --  1.53*  --   --  1.99*   GFRESTIMATED  --  53*  --   --  38*   GERTRUDIS  --  9.3  --   --  9.5    < > = values in this interval not displayed.     No results for input(s): CULT in the last 168 hours.    Imaging:  No results found for this or any previous visit (from the past 24 hour(s)).      Matias Herzog DO  [facility] Hospitalist  [address] [place] [address]  06/04/2022

## 2022-06-04 NOTE — PLAN OF CARE
Goal Outcome Evaluation:        Vital Signs: WNL. Patient afebrile.   Pain/Comfort: patient rating pain as a 2-3/10. Patient stating no additional interventions needed at this time. Patient keeping leg elevated. Patient encouraged to call RN if patient starts having increased pain. Patient stated an understanding.   Assessment: LLE red/storm. Cellulitis outlined and within markings. LLE elevated when in bed.   Diet: patient tolerating PO intake. Fluids encouraged.   Output: patient voiding independently.   Activity/Ambulation: patient up independently in room.   Social: patient calm and cooperative.   Plan: pain control. Monitor LLE cellulitis. Maintain PIV. IVF. IV ABX. Blood sugar checks as ordered. Labs in the Am. Will continue to monitor and will notify service with any questions or concerns.

## 2022-06-04 NOTE — PLAN OF CARE
Orientation:A&ox3  Vss afebrile.   02:ra 95%  LS:clear  GI: bs+,   :voiding with out problems  Skin:left leg red, +1 swelling. Redness with in marked area. Leg elevated.   Activity:up indep in room.  Pain: denies pain  Plan: iv abx. Bc pending. Ot 112, 132, 178. Novalog given with dinner meal.      Plan of Care Reviewed With: patient

## 2022-06-04 NOTE — PROVIDER NOTIFICATION
0655 - MD notified of updated result from + blood cultures from 6/3/22 @ 0045 - growing staphylococcus species. Awaiting response.

## 2022-06-04 NOTE — PROVIDER NOTIFICATION
5651 - service paged regarding + blood cultures from 6/3/22 @ 9105. Gram + cocci in clusters. Awaiting response.     Pt admitted with cellulitis and on ceftriaxone therapy.  Will increase dose of ceftriaxone to 2 g daily.  Await speciation, given CKD3b want to try to avoid vanco.  He is not spiking fevers, non-tachy and normotensive - hypertensive.    Rosendo Dexter MD

## 2022-06-05 VITALS
WEIGHT: 220 LBS | OXYGEN SATURATION: 96 % | DIASTOLIC BLOOD PRESSURE: 84 MMHG | TEMPERATURE: 97.8 F | SYSTOLIC BLOOD PRESSURE: 162 MMHG | RESPIRATION RATE: 16 BRPM | HEART RATE: 66 BPM | BODY MASS INDEX: 31.57 KG/M2

## 2022-06-05 LAB
ANION GAP SERPL CALCULATED.3IONS-SCNC: 4 MMOL/L (ref 3–14)
BUN SERPL-MCNC: 30 MG/DL (ref 7–30)
CALCIUM SERPL-MCNC: 9.5 MG/DL (ref 8.5–10.1)
CHLORIDE BLD-SCNC: 110 MMOL/L (ref 94–109)
CO2 SERPL-SCNC: 27 MMOL/L (ref 20–32)
CREAT SERPL-MCNC: 1.5 MG/DL (ref 0.66–1.25)
ERYTHROCYTE [DISTWIDTH] IN BLOOD BY AUTOMATED COUNT: 12.8 % (ref 10–15)
GFR SERPL CREATININE-BSD FRML MDRD: 54 ML/MIN/1.73M2
GLUCOSE BLD-MCNC: 134 MG/DL (ref 70–99)
GLUCOSE BLDC GLUCOMTR-MCNC: 125 MG/DL (ref 70–99)
GLUCOSE BLDC GLUCOMTR-MCNC: 145 MG/DL (ref 70–99)
HCT VFR BLD AUTO: 35.7 % (ref 40–53)
HGB BLD-MCNC: 11.1 G/DL (ref 13.3–17.7)
MCH RBC QN AUTO: 28.7 PG (ref 26.5–33)
MCHC RBC AUTO-ENTMCNC: 31.1 G/DL (ref 31.5–36.5)
MCV RBC AUTO: 92 FL (ref 78–100)
PLATELET # BLD AUTO: 230 10E3/UL (ref 150–450)
POTASSIUM BLD-SCNC: 4.1 MMOL/L (ref 3.4–5.3)
RBC # BLD AUTO: 3.87 10E6/UL (ref 4.4–5.9)
SODIUM SERPL-SCNC: 141 MMOL/L (ref 133–144)
WBC # BLD AUTO: 6.2 10E3/UL (ref 4–11)

## 2022-06-05 PROCEDURE — 87040 BLOOD CULTURE FOR BACTERIA: CPT | Performed by: HOSPITALIST

## 2022-06-05 PROCEDURE — 99239 HOSP IP/OBS DSCHRG MGMT >30: CPT | Performed by: INTERNAL MEDICINE

## 2022-06-05 PROCEDURE — 250N000011 HC RX IP 250 OP 636: Performed by: HOSPITALIST

## 2022-06-05 PROCEDURE — 36415 COLL VENOUS BLD VENIPUNCTURE: CPT | Performed by: HOSPITALIST

## 2022-06-05 PROCEDURE — 250N000013 HC RX MED GY IP 250 OP 250 PS 637: Performed by: INTERNAL MEDICINE

## 2022-06-05 PROCEDURE — 85027 COMPLETE CBC AUTOMATED: CPT | Performed by: INTERNAL MEDICINE

## 2022-06-05 PROCEDURE — 80048 BASIC METABOLIC PNL TOTAL CA: CPT | Performed by: INTERNAL MEDICINE

## 2022-06-05 PROCEDURE — 250N000011 HC RX IP 250 OP 636: Performed by: INTERNAL MEDICINE

## 2022-06-05 RX ADMIN — LOSARTAN POTASSIUM 100 MG: 100 TABLET, FILM COATED ORAL at 08:41

## 2022-06-05 RX ADMIN — HEPARIN SODIUM 5000 UNITS: 10000 INJECTION, SOLUTION INTRAVENOUS; SUBCUTANEOUS at 08:41

## 2022-06-05 RX ADMIN — CEFTRIAXONE 2 G: 2 INJECTION, POWDER, FOR SOLUTION INTRAMUSCULAR; INTRAVENOUS at 08:41

## 2022-06-05 ASSESSMENT — ACTIVITIES OF DAILY LIVING (ADL)
ADLS_ACUITY_SCORE: 20

## 2022-06-05 NOTE — DISCHARGE SUMMARY
Hospitalist Discharge Summary  [facility]    Donnie Morales MRN# [id number]   YOB: 1965 Age: 57 year old     Date of Admission:  6/2/2022  Date of Discharge:  6/5/2022  Admitting Physician:  Jono Cullen MD, MD  Discharge Physician:  Matias Herzog DO  Discharging Service:  Hospitalist     Primary Provider: [facility]          Discharge Diagnosis:     LLE Cellulitis  - Possibly secondary to cat scratch  - Continue IV Ceftriaxone - transition to oral augmentin for 7 days     1/2 Blood Culture Positive for Coag-Neg Staph  - Suspect contaminant  - Repeat blood culture NTD  - Continue Ceftriaxone     Hypertensive Urgency  - Improved  - Continue Losartan     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  - A1C = 6.0 on 6/2/22  - Lantus 11 units on hold secondary to normoglycemia and decreased oral intake  - Continue ISS  - PTA Metformin on hold     Chronic Kidney Disease Stage 3  - Baseline Cr = 1.9-2.1  - Avoid Nephrotoxins  - Daily BMP             Discharge Disposition:     Discharged to home           Allergies:     No Known Allergies           Discharge Medications:     Current Discharge Medication List      START taking these medications    Details   amoxicillin-clavulanate (AUGMENTIN) 875-125 MG tablet Take 1 tablet by mouth 2 times daily for 7 days  Qty: 14 tablet, Refills: 0    Associated Diagnoses: Cellulitis of left lower extremity         CONTINUE these medications which have NOT CHANGED    Details   insulin glargine (LANTUS VIAL) 100 UNIT/ML vial Inject 30 Units Subcutaneous At Bedtime    Associated Diagnoses: Acute osteomyelitis of left foot (H)      losartan (COZAAR) 100 MG tablet Take 100 mg by mouth daily      Multiple Vitamins-Minerals (MULTIVITAMIN PO) Take 1 tablet by mouth daily       Multiple Vitamins-Minerals (ZINC PO) Take 1 tablet by mouth daily      triamcinolone (KENALOG) 0.1 % cream Apply topically 2 times daily as needed        vitamin B-Complex Take 1 tablet by mouth daily         STOP taking these medications       metFORMIN (GLUCOPHAGE) 1000 MG tablet Comments:   Reason for Stopping:                      Condition on Discharge:     Discharge condition: Fair   Discharge vitals: Blood pressure (!) 159/86, pulse 71, temperature 97.6  F (36.4  C), temperature source Oral, resp. rate 16, weight 99.8 kg (220 lb), SpO2 96 %.   Code status on discharge: Full Code      BASIC PHYSICAL EXAMINATION:  GENERAL: No apparent distress.  CARDIOVASCULAR: Regular rate and rhythm without murmurs.  PULMONARY: Clear to auscultation bilaterally.   GASTROINTESTINAL: Abdomen soft, non-tender.  EXTREMITIES: No edema, pulses intact.  NEUROLOGIC: No focal deficits.            History of Illness:   See detailed admission note for full details.               Procedures excluding imaging which is summarized below:     Please see details in the electronic medical record.           Consultations:     None          Significant Results:     Results for orders placed or performed during the hospital encounter of 11/02/15   MR Foot Left w/o & w Contrast    Narrative    MRI LEFT FOOT WITH AND WITHOUT INTRAVENOUS CONTRAST   11/2/2015 5:07  PM    HISTORY: Ulcer of the second toe. Evaluate for osteomyelitis.    COMPARISON: Radiographs earlier today.    TECHNIQUE: Sagittal, coronal, and transverse MR imaging was performed  through the left foot before and after the uneventful intravenous  administration of 10 mL of Gadavist contrast.     FINDINGS: There is prominent marrow edema and enhancement of the  distal and middle phalanges of the second toe and to a somewhat lesser  degree the proximal phalanx and distal second metatarsal. The distal  phalanx is markedly indistinct with cortical erosions. There is an  overlying soft tissue defect over the tuft of this toe. There is  moderate surrounding soft tissue edema. No distinct abscess or  drainable fluid collection is  seen.    There is a prominent hallux valgus deformity with advanced  degenerative changes of the first metatarsophalangeal joint. No  fracture or other osseous lesion is seen, and no other abnormal marrow  signal intensity or contrast enhancement is identified. There are  moderate degenerative changes in the visualized midfoot. There is  solid bone bridging across several of the tarsal bones consistent with  fusion. No other abnormality is demonstrated.      Impression    IMPRESSION: MRI findings are highly suggestive of osteomyelitis of the  distal second toe. This predominantly involves the distal and middle  phalanges. Less extensive signal abnormality in the proximal phalanx  and distal first metatarsal are nonspecific. Although these other  areas may simply be reactive in nature from surrounding cellulitis,  less extensive osteomyelitis cannot be excluded. No abscess is seen.    MARCIN CROCKETT MD   US Lower Extremity Venous Duplex Left    Narrative    VENOUS DOPPLER LEFT LOWER EXTREMITY 11/2/2015 3:38 PM    HISTORY: Left leg swelling.    COMPARISON: None.    FINDINGS: Color-flow imaging and Doppler waveform spectral analysis  were utilized. There is normal compressibility and spontaneous flow  throughout the left common femoral, superficial femoral, popliteal and  visualized calf veins.      Impression    IMPRESSION: No evidence for deep venous thrombosis.    FOREST AGUIRRE MD   XR Foot Left 2 Views    Narrative    XR FOOT LT 2 VW 11/4/2015 7:03 AM    HISTORY: post-op assessment,            Impression    IMPRESSION: Postoperative change with amputation of the second toe at  the level of the distal end of the second metatarsal. There is a  foreign body at this level likely representing a surgical sponge or  towel. Prominent hallux valgus deformity with moderate degenerative  change first MTP joint. Fusion of the tarsal bones. Moderate  degenerative change of the TMT joints.     TIM FOWLER MD        Transthoracic Echocardiogram Results:  No results found for this or any previous visit (from the past 4320 hour(s)).             Pending Results:     Unresulted Labs Ordered in the Past 30 Days of this Admission     Date and Time Order Name Status Description    6/5/2022 12:01 AM Blood Culture Arm, Right In process     6/4/2022  4:15 AM Blood Culture Arm, Right Preliminary     6/3/2022 12:03 AM Blood Culture Peripheral Blood Preliminary     6/3/2022 12:03 AM Blood Culture Arm, Left Preliminary                       Discharge Instructions and Follow-Up:     Discharge instructions and follow-up:   Discharge Procedure Orders   Reason for your hospital stay   Order Comments: Left Lower Extremity Cellulitis     Follow-up and recommended labs and tests    Order Comments: Follow up with primary care provider, [facility], within 7 days for hospital follow- up.  The following labs/tests are recommended: CBC, BMP, Blood sugar and insulin regimen check.    Recommend you hold your metformin until you finish your course of Augmentin.  Once completed you may restart your metformin.  Monitor your blood sugars at least 4 times daily, before meals and at bedtime.  As you return to your home routine home regimen you are insulin regimen may need to be adjusted.  Your blood sugars have been on the lower side during your hospitalization and your Lantus was held.  If your blood sugars are persistently below 80 or above 250, then you need further adjustments to your insulin regimen and should call your primary care doctor.  If your blood sugars are less than 80, do not take your insulin.     Activity   Order Comments: Your activity upon discharge: activity as tolerated     Order Specific Question Answer Comments   Is discharge order? Yes      Diet   Order Comments: Follow this diet upon discharge: Orders Placed This Encounter      Moderate Consistent Carb (60 g CHO per Meal) Diet     Order Specific Question Answer  Comments   Is discharge order? Yes              Hospital Course:     Dnonie Morales is a 57 year old male with a history of type 2 diabetes mellitus with neuropathy and nephropathy, hypertension, chronic kidney disease stage III admitted on 6/2/2022 with left lower extremity redness.  In the emergency department the patient was found to have a temperature of 97.8  F, heart rate 78, blood pressure 200/98, respiratory rate 16, SPO2 94% on room air.  Initial lab work revealed BUN 39, creatinine 1.99, , A1c 6.0, WBC 9.1, hemoglobin 12.3.  The patient was started on IV ceftriaxone with concern for left lower extremity cellulitis.  Blood cultures were obtained.  Of note, 1 out of 2 blood cultures returned positive for coagulase-negative Staphylococcus, which was thought likely a contaminant.  Repeat blood cultures remained negative.  On 6/5/2022, the patient was transitioned to oral Augmentin and discharged home.    The patient was seen, examined, and counseled on this day. The hospitalization and plan of care was reviewed with the patient extensively. All questions were addressed and the patient agreed to follow-up as noted above.      Total time spent in face to face contact with the patient and coordinating discharge was:  32 Minutes    Matais Herzog DO  [facility] Hospitalist  [address] [place] [address]  06/05/2022

## 2022-06-05 NOTE — PLAN OF CARE
Vss afebrile. Denies pain. Redness with in the marked area. IV abx given this am.  this am.   Pt discharge to home. Discharge instructions reviewed with pt. Pt understood all instruction, meds, F/U appts needed. 1 rx filled at [facility] pharmacy and given to pt. All belongings taken with pt.   Plan of Care Reviewed With: patient

## 2022-06-05 NOTE — PLAN OF CARE
Goal Outcome Evaluation:         Vital Signs: WNL. Patient afebrile.   Pain/Comfort: patient stating that he is in no pain at this time. Patient encouraged to call RN if patient starts having increased pain. Patient stated an understanding.   Assessment: LLE cellulitis red/storm and blanchable -  Within marked area. Per patient looks better than the previous day. PIV site c/d/i and flushed.   Diet: patient tolerating PO intake. Blood sugar checks before meals, at bedtime, and 0200.   Output: patient voiding independently.   Activity/Ambulation: patient up independently in room.   Social: patient calm can cooperative.   Plan: Pain control. Monitor LLE cellulitis. Maintain PIV. IV ABX. Monitor I&O. Labs in AM. Possible discharge home today.

## 2022-06-05 NOTE — PROGRESS NOTES
[facility]    Hospitalist Progress Note  Name: Donnie Morales    MRN: [id number]  Provider:  Matias Herzog DO  Date of Service: 06/05/2022    Summary of Stay: Donnie Morales is a 57 year old male with a history of type 2 diabetes mellitus with neuropathy and nephropathy, hypertension, chronic kidney disease stage III admitted on 6/2/2022 with left lower extremity redness.  In the emergency department the patient was found to have a temperature of 97.8  F, heart rate 78, blood pressure 200/98, respiratory rate 16, SPO2 94% on room air.  Initial lab work revealed BUN 39, creatinine 1.99, , A1c 6.0, WBC 9.1, hemoglobin 12.3.  The patient was started on IV ceftriaxone with concern for left lower extremity cellulitis.  Blood cultures were obtained.  Of note, 1 out of 2 blood cultures returned positive for coagulase-negative Staphylococcus, which was thought likely a contaminant.  Repeat blood cultures remained negative.  On 6/5/2022, the patient was transitioned to oral Augmentin and discharged home.    TODAY'S PLAN: Discussed discharge follow-up recommendations with the patient including following up with his PCP later this week for monitoring his infection as well as blood sugar and insulin regimen checkup to see if adjustments need to be made.  Recommended patient hold his metformin until he finishes the course of Augmentin as he states that metformin causes loose stools and so he would not be able to watch for concerning for recurrent C. difficile.  Discharge home today.  Patient will drive himself home.    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 Goals prior to discharge include abx plan established.   Family updated today: No     Interval History   Pt seen and examined.  Pt denies nausea, diarrhea.    -Data reviewed today: I personally reviewed all new labs and imaging results over the last 24 hours.     Physical Exam   Temp: 97.6  F (36.4  C) Temp src: Oral BP: (!) 159/86 Pulse: 71   Resp: 16 SpO2: 96 %      Vitals:    06/02/22 2021   Weight: 99.8 kg (220 lb)     Vital Signs with Ranges  Temp:  [97.6  F (36.4  C)-98  F (36.7  C)] 97.6  F (36.4  C)  Pulse:  [67-74] 71  Resp:  [15-16] 16  BP: (153-165)/(75-86) 159/86  Cuff Mean (mmHg):  [110-114] 114  SpO2:  [96 %-97 %] 96 %  No intake/output data recorded.    GENERAL: No apparent distress. Awake, alert, and fully oriented.  HEENT: Normocephalic, atraumatic. Extraocular movements intact.  CARDIOVASCULAR: Regular rate and rhythm without murmurs or rubs. No S3.  PULMONARY: Clear bilaterally.  GASTROINTESTINAL: Soft, non-tender, non-distended. Bowel sounds normoactive.   EXTREMITIES: improving erythema of LLE  NEUROLOGICAL: CN 2-12 grossly intact, no focal neurological deficits.  DERMATOLOGICAL: No rash, ulcer, bruising, nor jaundice.    Medications       cefTRIAXone  2 g Intravenous Q24H     heparin ANTICOAGULANT  5,000 Units Subcutaneous Q12H     insulin aspart  1-7 Units Subcutaneous TID AC     insulin aspart  1-5 Units Subcutaneous At Bedtime     [Held by provider] insulin glargine  10 Units Subcutaneous BID     losartan  100 mg Oral Daily     sodium chloride (PF)  3 mL  Intracatheter Q8H     Data     Laboratory:  Recent Labs   Lab 06/05/22  0636 06/04/22 0630 06/02/22 2126   WBC 6.2 7.1 9.1   HGB 11.1* 11.2* 12.3*   HCT 35.7* 36.3* 40.0   MCV 92 92 92    226 228     Recent Labs   Lab 06/05/22  0636 06/05/22  0232 06/04/22  2135 06/04/22  0741 06/04/22 0630 06/03/22  0200 06/02/22 2126     --   --   --  142  --  136   POTASSIUM 4.1  --   --   --  3.9  --  4.4   CHLORIDE 110*  --   --   --  107  --  98   CO2 27  --   --   --  27  --  30   ANIONGAP 4  --   --   --  8  --  8   * 145* 198*   < > 124*   < > 102*   BUN 30  --   --   --  32*  --  39*   CR 1.50*  --   --   --  1.53*  --  1.99*   GFRESTIMATED 54*  --   --   --  53*  --  38*   GERTRUDIS 9.5  --   --   --  9.3  --  9.5    < > = values in this interval not displayed.     No results for input(s): CULT in the last 168 hours.    Imaging:  No results found for this or any previous visit (from the past 24 hour(s)).      Matias Herzog DO  [facility] Hospitalist  [address] [place] [address]  06/05/2022

## 2022-06-06 LAB
BACTERIA BLD CULT: ABNORMAL
BACTERIA BLD CULT: ABNORMAL

## 2022-06-08 LAB — BACTERIA BLD CULT: NO GROWTH

## 2022-06-09 LAB — BACTERIA BLD CULT: NO GROWTH

## 2022-06-10 LAB — BACTERIA BLD CULT: NO GROWTH

## 2022-10-09 ENCOUNTER — HEALTH MAINTENANCE LETTER (OUTPATIENT)
Age: 57
End: 2022-10-09

## 2023-06-10 ENCOUNTER — HEALTH MAINTENANCE LETTER (OUTPATIENT)
Age: 58
End: 2023-06-10

## 2023-08-19 ENCOUNTER — HEALTH MAINTENANCE LETTER (OUTPATIENT)
Age: 58
End: 2023-08-19

## 2024-08-03 ENCOUNTER — HEALTH MAINTENANCE LETTER (OUTPATIENT)
Age: 59
End: 2024-08-03